# Patient Record
Sex: MALE | Race: WHITE | NOT HISPANIC OR LATINO | Employment: FULL TIME | URBAN - METROPOLITAN AREA
[De-identification: names, ages, dates, MRNs, and addresses within clinical notes are randomized per-mention and may not be internally consistent; named-entity substitution may affect disease eponyms.]

---

## 2021-02-03 ENCOUNTER — VIRTUAL VISIT (OUTPATIENT)
Dept: NEUROLOGY | Facility: CLINIC | Age: 41
End: 2021-02-03
Payer: COMMERCIAL

## 2021-02-03 VITALS — WEIGHT: 195 LBS | HEIGHT: 71 IN | BODY MASS INDEX: 27.3 KG/M2

## 2021-02-03 DIAGNOSIS — M54.2 NECK PAIN: ICD-10-CM

## 2021-02-03 DIAGNOSIS — G43.719 INTRACTABLE CHRONIC MIGRAINE WITHOUT AURA AND WITHOUT STATUS MIGRAINOSUS: Primary | ICD-10-CM

## 2021-02-03 PROCEDURE — 99245 OFF/OP CONSLTJ NEW/EST HI 55: CPT | Mod: 95 | Performed by: PSYCHIATRY & NEUROLOGY

## 2021-02-03 RX ORDER — VENLAFAXINE HYDROCHLORIDE 150 MG/1
150 CAPSULE, EXTENDED RELEASE ORAL AT BEDTIME
COMMUNITY
Start: 2020-12-08

## 2021-02-03 RX ORDER — TOPIRAMATE 25 MG/1
TABLET, FILM COATED ORAL
Qty: 120 TABLET | Refills: 1 | Status: SHIPPED | OUTPATIENT
Start: 2021-02-03

## 2021-02-03 RX ORDER — RIZATRIPTAN BENZOATE 10 MG/1
10 TABLET ORAL
Qty: 18 TABLET | Refills: 1 | Status: SHIPPED | OUTPATIENT
Start: 2021-02-03

## 2021-02-03 ASSESSMENT — MIFFLIN-ST. JEOR: SCORE: 1806.64

## 2021-02-03 NOTE — LETTER
"    2/3/2021         RE: Jhony Paul  [address]  [facility] [address]        Dear Colleague,    Thank you for referring your patient, Jhony Paul, to the [facility] [place]. Please see a copy of my visit note below.    NEUROLOGY OUTPATIENT CONSULT NOTE (VIDEO)  Feb 3, 2021     CHIEF COMPLAINT/REASON FOR VISIT/REASON FOR CONSULT  Patient presents with:  Headache:  back of his head    REASON FOR CONSULTATION- Headaches    REFERRAL SOURCE  Dr. Dariel Han  CC Dr. Dariel Han    Video Visit Consent  Patient is being evaluated via a billable video visit. The patient has been notified of following:   \"This video visit will be conducted via a call between you and your physician/provider. We have found that certain health care needs can be provided without the need for an in-person physical exam. This service lets us provide the care you need with a video conversation. If a prescription is necessary we can send it directly to your pharmacy. If lab work is needed we can place an order for that and you can then stop by our lab to have the test done at a later time.  If during the course of the call the physician/provider feels a video visit is not appropriate, you will not be charged for this service.  Physician has received verbal consent for a Video Visit from the patient? YES  Patient would like the video invitation sent by: Email/SMS    Video Visit Details  Type of service: Video Visit  Video Start Time: 10:25  Video End Time (time video stopped): 10:55  Originating Location (pt. Location): Patient's Home  Distant Location (provider location): [facility]   Mode of Communication: Video Conference via [employer]    HISTORY OF PRESENT ILLNESS  Jhony Paul is a 40 year old male seen today for evaluation of headaches.  Patient reports that he has had headaches since age 13.  Headaches have gotten slightly more progressive over " the last few years.  He has headaches about 5 times a week.  They are generally frontal but sometimes can be in the back.  They are generally bilateral.  Using ice pack does help with the headaches.  He will get neck pain and sore shoulder with the headaches but not always.  Has not really tried massaging or acupuncture.  He reports that the pain is more of a throbbing/dull pain.  Occasionally he will have nausea with the headache.  He feels twitchy all over.  Denies any visual auras.  Does have some double vision/difficulty tracking with his eyes at the time of the headache.  There is also photophobia and phonophobia.  He would prefer to be in a dark room.  Reports no clear triggers though sitting in awkward positions for over 15 minutes can cause the headaches.  Start Flexeril which did not help.  He is off the Flexeril right now.  Has not tried any other medications though is on Effexor for anxiety.  This does not help with the headaches.  Ibuprofen is the best abortive therapies found.    Previous history is reviewed and this is unchanged.    PAST MEDICAL/SURGICAL HISTORY  Past Medical History:   Diagnosis Date     Neck injury      There is no problem list on file for this patient.  Significant for anxiety and neck injury at age 14    FAMILY HISTORY  Family History   Problem Relation Age of Onset     Diabetes Mother      Migraines Mother      Diabetes Maternal Grandfather      Cerebrovascular Disease Paternal Grandmother    History positive for headaches/migraines in his mother    SOCIAL HISTORY  Social History     Tobacco Use     Smoking status: Former Smoker     Smokeless tobacco: Former User   Substance Use Topics     Alcohol use: Yes     Comment: rare     Drug use: Not Currently       SYSTEMS REVIEW  Twelve-system ROS was done and other than the HPI this was negative except some some tingling and twitching with the headaches.    MEDICATIONS       venlafaxine (EFFEXOR-XR) 150 MG 24 hr capsule, Take 150 mg by  "mouth At Bedtime       Pseudoephedrine-Ibuprofen  MG TABS, Take 2 tablets by mouth daily    No current facility-administered medications on file prior to visit.        PHYSICAL EXAMINATION  VITALS: Ht 1.803 m (5' 11\")   Wt 88.5 kg (195 lb)   BMI 27.20 kg/m    Exam was limited due to video encounter.    Vitals-Unable to do on video  GENERAL -Health appearing, No apparent distress  EYES- No scleral icterus, no eyelid droop, Pupils symmetric  HEENT - Normocephalic, atraumatic, Hearing grossly intact; Oral mucosa moist and pink in color. External Ears and nose intact.   Neck - soft/flexible with normal ROM on visual inspection.  PULM - Good spontaneous respiratory effort;  CV- No edema on visual inspection  MSK- Gait - see Neuro section; Strength and tone- see Neuro section; Range of motion grossly intact.  Psych- Normal mood and affect. Good judgment and insight.     Neurological  Mental status - Patient is awake and oriented. Attention span is normal. Language is fluent and follows commands appropriately.   Cranial nerves - Pupils are symmetric; EOMI, NLF symmetric  Motor - There is no pronator drift. Antigravity in all 4 ext.  Tone - No evidence of rigidity on visual inspection. No tremor.  Reflexes - Unable to do on video  Sensation - Unable to do on Video  Coordination - Finger to nose without dysmetria.   Gait and station - Romberg is negative. Gait is steady      DIAGNOSTICS    OUTSIDE RECORDS  Outside referral notes were reviewed and pertinent information has been summarized:-Patient was last seen in December.  Has history of depression anxiety.  Has been having occasional intermittent occipital headaches that radiate from the neck and upper back.  Interested in seeing a neurologist to discuss this further.  Has been pursuing physical therapy. Plan was to referred to neurology.  Continue Effexor for depression.  No testing or imaging studies were done.    IMPRESSION/REPORT/PLAN  Chronic migraine r/o " structural lesion  Neck pain    This is a 40 year old male with headache suggestive of chronic migraines.  I believe his neck pain is more of a referred pain from the migraine than the other way around.  I would like to get a MRI of the brain to rule out any structural lesions that could be causing his headaches.  We will also get blood work to look for other causes of headaches.  We will start him on Maxalt for abortive therapy.  Will use Topamax for prevention of headaches.  Discussed side effects of Topamax with the patient.  He should keep a log of his headaches and see how his headaches respond to the medications.  I can see him back in about 6 weeks.    -     MR Brain w/o Contrast; Future  -     Vitamin B12; Future  -     TSH with free T4 reflex; Future  -     Lyme Disease Janet with reflex to WB Serum; Future  -     Magnesium; Future  -     CBC with platelets differential; Future  -     rizatriptan (MAXALT) 10 MG tablet; Take 1 tablet (10 mg) by mouth at onset of headache for migraine May repeat in 2 hours.  -     topiramate (TOPAMAX) 25 MG tablet; Start with 1 tab/night and increase by 1 tablet/week as needed and tolerated to a max of 4 tabs/night.  -     Basic metabolic panel; Future    Return in about 6 weeks (around 3/17/2021) for Virtual or clinic.    Over 60 minutes were spent coordinating the care for the patient today.  This includes previsit, during visit and post visit activities as documented above.  (Activities include but not inclusive of reviewing chart, reviewing outside records, reviewing labs and imaging study results as well as the images, patient visit time including getting history and exam,  use if applicable, review of test results with the patient and coming up with a plan in a shared model, counseling patient and family, education and answering patient questions, EMR , EMR diagnosis entry and problem list management, medication reconciliation and prescription  management if applicable, paperwork if applicable, printing documents and documentation of the visit activities.)  Billin data points and 4 problem points        Maurizio Salcedo MD  Neurologist  [facility]  Tel:- [phone]    This note was dictated using voice recognition software.  Any grammatical or context distortions are unintentional and inherent to the software.        Again, thank you for allowing me to participate in the care of your patient.        Sincerely,        Maurizio Salcedo MD

## 2021-02-03 NOTE — PROGRESS NOTES
"NEUROLOGY OUTPATIENT CONSULT NOTE (VIDEO)  Feb 3, 2021     CHIEF COMPLAINT/REASON FOR VISIT/REASON FOR CONSULT  Patient presents with:  Headache:  back of his head    REASON FOR CONSULTATION- Headaches    REFERRAL SOURCE  Dr. Dariel Han  CC Dr. Dariel Han    Video Visit Consent  Patient is being evaluated via a billable video visit. The patient has been notified of following:   \"This video visit will be conducted via a call between you and your physician/provider. We have found that certain health care needs can be provided without the need for an in-person physical exam. This service lets us provide the care you need with a video conversation. If a prescription is necessary we can send it directly to your pharmacy. If lab work is needed we can place an order for that and you can then stop by our lab to have the test done at a later time.  If during the course of the call the physician/provider feels a video visit is not appropriate, you will not be charged for this service.  Physician has received verbal consent for a Video Visit from the patient? YES  Patient would like the video invitation sent by: Email/SMS    Video Visit Details  Type of service: Video Visit  Video Start Time: 10:25  Video End Time (time video stopped): 10:55  Originating Location (pt. Location): Patient's Home  Distant Location (provider location): [facility] [place]   Mode of Communication: Video Conference via [employer]    HISTORY OF PRESENT ILLNESS  Jhony Paul is a 40 year old male seen today for evaluation of headaches.  Patient reports that he has had headaches since age 13.  Headaches have gotten slightly more progressive over the last few years.  He has headaches about 5 times a week.  They are generally frontal but sometimes can be in the back.  They are generally bilateral.  Using ice pack does help with the headaches.  He will get neck pain and sore shoulder with the headaches but not " "always.  Has not really tried massaging or acupuncture.  He reports that the pain is more of a throbbing/dull pain.  Occasionally he will have nausea with the headache.  He feels twitchy all over.  Denies any visual auras.  Does have some double vision/difficulty tracking with his eyes at the time of the headache.  There is also photophobia and phonophobia.  He would prefer to be in a dark room.  Reports no clear triggers though sitting in awkward positions for over 15 minutes can cause the headaches.  Start Flexeril which did not help.  He is off the Flexeril right now.  Has not tried any other medications though is on Effexor for anxiety.  This does not help with the headaches.  Ibuprofen is the best abortive therapies found.    Previous history is reviewed and this is unchanged.    PAST MEDICAL/SURGICAL HISTORY  Past Medical History:   Diagnosis Date     Neck injury      There is no problem list on file for this patient.  Significant for anxiety and neck injury at age 14    FAMILY HISTORY  Family History   Problem Relation Age of Onset     Diabetes Mother      Migraines Mother      Diabetes Maternal Grandfather      Cerebrovascular Disease Paternal Grandmother    History positive for headaches/migraines in his mother    SOCIAL HISTORY  Social History     Tobacco Use     Smoking status: Former Smoker     Smokeless tobacco: Former User   Substance Use Topics     Alcohol use: Yes     Comment: rare     Drug use: Not Currently       SYSTEMS REVIEW  Twelve-system ROS was done and other than the HPI this was negative except some some tingling and twitching with the headaches.    MEDICATIONS       venlafaxine (EFFEXOR-XR) 150 MG 24 hr capsule, Take 150 mg by mouth At Bedtime       Pseudoephedrine-Ibuprofen  MG TABS, Take 2 tablets by mouth daily    No current facility-administered medications on file prior to visit.        PHYSICAL EXAMINATION  VITALS: Ht 1.803 m (5' 11\")   Wt 88.5 kg (195 lb)   BMI 27.20 kg/m  "   Exam was limited due to video encounter.    Vitals-Unable to do on video  GENERAL -Health appearing, No apparent distress  EYES- No scleral icterus, no eyelid droop, Pupils symmetric  HEENT - Normocephalic, atraumatic, Hearing grossly intact; Oral mucosa moist and pink in color. External Ears and nose intact.   Neck - soft/flexible with normal ROM on visual inspection.  PULM - Good spontaneous respiratory effort;  CV- No edema on visual inspection  MSK- Gait - see Neuro section; Strength and tone- see Neuro section; Range of motion grossly intact.  Psych- Normal mood and affect. Good judgment and insight.     Neurological  Mental status - Patient is awake and oriented. Attention span is normal. Language is fluent and follows commands appropriately.   Cranial nerves - Pupils are symmetric; EOMI, NLF symmetric  Motor - There is no pronator drift. Antigravity in all 4 ext.  Tone - No evidence of rigidity on visual inspection. No tremor.  Reflexes - Unable to do on video  Sensation - Unable to do on Video  Coordination - Finger to nose without dysmetria.   Gait and station - Romberg is negative. Gait is steady      DIAGNOSTICS    OUTSIDE RECORDS  Outside referral notes were reviewed and pertinent information has been summarized:-Patient was last seen in December.  Has history of depression anxiety.  Has been having occasional intermittent occipital headaches that radiate from the neck and upper back.  Interested in seeing a neurologist to discuss this further.  Has been pursuing physical therapy. Plan was to referred to neurology.  Continue Effexor for depression.  No testing or imaging studies were done.    IMPRESSION/REPORT/PLAN  Chronic migraine r/o structural lesion  Neck pain    This is a 40 year old male with headache suggestive of chronic migraines.  I believe his neck pain is more of a referred pain from the migraine than the other way around.  I would like to get a MRI of the brain to rule out any structural  lesions that could be causing his headaches.  We will also get blood work to look for other causes of headaches.  We will start him on Maxalt for abortive therapy.  Will use Topamax for prevention of headaches.  Discussed side effects of Topamax with the patient.  He should keep a log of his headaches and see how his headaches respond to the medications.  I can see him back in about 6 weeks.    -     MR Brain w/o Contrast; Future  -     Vitamin B12; Future  -     TSH with free T4 reflex; Future  -     Lyme Disease Janet with reflex to WB Serum; Future  -     Magnesium; Future  -     CBC with platelets differential; Future  -     rizatriptan (MAXALT) 10 MG tablet; Take 1 tablet (10 mg) by mouth at onset of headache for migraine May repeat in 2 hours.  -     topiramate (TOPAMAX) 25 MG tablet; Start with 1 tab/night and increase by 1 tablet/week as needed and tolerated to a max of 4 tabs/night.  -     Basic metabolic panel; Future    Return in about 6 weeks (around 3/17/2021) for Virtual or clinic.    Over 60 minutes were spent coordinating the care for the patient today.  This includes previsit, during visit and post visit activities as documented above.  (Activities include but not inclusive of reviewing chart, reviewing outside records, reviewing labs and imaging study results as well as the images, patient visit time including getting history and exam,  use if applicable, review of test results with the patient and coming up with a plan in a shared model, counseling patient and family, education and answering patient questions, EMR , EMR diagnosis entry and problem list management, medication reconciliation and prescription management if applicable, paperwork if applicable, printing documents and documentation of the visit activities.)  Billin data points and 4 problem points        Maurizio Salcedo MD  Neurologist  [facility]  Tel:- [phone]    This  note was dictated using voice recognition software.  Any grammatical or context distortions are unintentional and inherent to the software.

## 2021-05-15 ENCOUNTER — HEALTH MAINTENANCE LETTER (OUTPATIENT)
Age: 41
End: 2021-05-15

## 2021-09-04 ENCOUNTER — HEALTH MAINTENANCE LETTER (OUTPATIENT)
Age: 41
End: 2021-09-04

## 2022-06-11 ENCOUNTER — HEALTH MAINTENANCE LETTER (OUTPATIENT)
Age: 42
End: 2022-06-11

## 2022-10-22 ENCOUNTER — HEALTH MAINTENANCE LETTER (OUTPATIENT)
Age: 42
End: 2022-10-22

## 2023-06-18 ENCOUNTER — HEALTH MAINTENANCE LETTER (OUTPATIENT)
Age: 43
End: 2023-06-18